# Patient Record
Sex: MALE
[De-identification: names, ages, dates, MRNs, and addresses within clinical notes are randomized per-mention and may not be internally consistent; named-entity substitution may affect disease eponyms.]

---

## 2019-09-23 PROBLEM — Z00.00 ENCOUNTER FOR PREVENTIVE HEALTH EXAMINATION: Status: ACTIVE | Noted: 2019-09-23

## 2019-09-24 ENCOUNTER — APPOINTMENT (OUTPATIENT)
Dept: OTOLARYNGOLOGY | Facility: CLINIC | Age: 84
End: 2019-09-24
Payer: MEDICARE

## 2019-09-24 VITALS
HEIGHT: 67 IN | DIASTOLIC BLOOD PRESSURE: 90 MMHG | WEIGHT: 165 LBS | SYSTOLIC BLOOD PRESSURE: 174 MMHG | HEART RATE: 65 BPM | BODY MASS INDEX: 25.9 KG/M2

## 2019-09-24 PROCEDURE — 31575 DIAGNOSTIC LARYNGOSCOPY: CPT

## 2019-09-24 PROCEDURE — 99214 OFFICE O/P EST MOD 30 MIN: CPT | Mod: 25

## 2019-09-24 PROCEDURE — 92553 AUDIOMETRY AIR & BONE: CPT | Mod: RT

## 2019-09-24 NOTE — REASON FOR VISIT
[Initial Evaluation] : an initial evaluation for [Hearing Loss] : hearing loss [Tinnitus] : tinnitus

## 2019-09-24 NOTE — ASSESSMENT
[FreeTextEntry1] : 1.  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 He has resolved and did not suggest other intervention\par 2.  He had 3 hours of tinnitus in the right ear that has since resolved. It was quite round and possibly be related to an extra couple of decibel hearing loss since his last visit. I reassured him that there was no significant pathology\par 3. his hearing is normal for the speech frequencies with sharp drop loss above that this was reviewed with him and I suggested repeating his audiogram in a year.\par 4. He has a pea sized right base of tongue apparent retention cyst that is asymptomatic.  I would reevaluate and marsupialize should this become symptomatic, enlarge or become infected.  I will re check next year.

## 2019-09-24 NOTE — PHYSICAL EXAM
[FreeTextEntry1] : \par The patient was alert and oriented and in no distress.\par Voice was clear.\par \par Face:\par The patient had no facial asymmetry or mass.\par The skin was unremarkable.\par \par Eyes:\par The pupils were equal round and reactive to light and accommodation.\par There was no significant nystagmus or disconjugate gaze noted.\par \par Nose: \par The external nose had no significant deformity.  There was no facial tenderness.\par Nasal endoscopy: \par \par Procedure Note:\par \par Nasal endoscopy was done with topical anesthesia and a fiberoptic endoscope.\par Indication: Nasal congestion, rule out sinusitis.\par Procedure: The nasal cavity was anesthetized with topical Afrin and Pontocaine. An  endoscope was used and inserted into the nasal cavity. \par Endocoscopy was performed to inspect the interior of the nasal cavity, the nasal septum,  the middle and superior meati, the inferior, middle and superior turbinates, and the spheno-ethmoidal  recesses, the nasopharynx and eustachian tube orifices bilaterally\par  This showed that the nasal mucosa was slightly boggy.  There was a moderate S-shaped septal deflection.  However, the middle meati were open.  There were no polyps, purulence or masses.  The eustachian tube orifices were clear.  The nasopharynx was benign and without mass.  The inferior and middle turbinates were slightly boggy, the superior meati were normal and the sphenoethmoidal recesses were without evidence of disease.\par \par He had a right septal deflection and moderately boggy mucosa\par \par \par Flexible fiberoptic laryngoscopy: CPT 52635\par Indications: Dysphagia\par Procedure note:\par \par Flexible fiberoptic laryngoscopy was performed because of dysphagia and because of the patient's inability to tolerate adequate mirror examination.\par \par The nasal cavity was anesthetized with Pontocaine and Afrin.\par The flexible endoscope was placed into the patient's nasal cavity.\par The nasopharynx was without masses.\par The oropharynx, vallecula and base of tongue had no masses.\par The epiglottis, aryepiglottic folds and false vocal cords were normal.\par The pyriform sinuses were without mucosal lesions or pooling of secretions.  \par The laryngeal ventricles were without lesions.\par The visualized subglottis was without mass.\par The lateral and posterior pharyngeal walls were clear and symmetrical.\par The vocal folds were clear and mobile; they abducted and adducted normally.\par There was no interarytenoid mass or fullness.\par He had a marble-sized right base of tongue whitish colored apparent retention cyst\par \par \par \par Oral cavity:\par The oral mucosa was normal.\par The oral and base of tongue were clear and without mass.\par The gingival and buccal mucosa were moist and without lesions.\par The palate moved well.\par There was no cleft to the palate.\par There appeared to be good salivary flow.  \par There was no pus, erythema or mass in the oral cavity.\par His base of tongue is soft to palpation\par \par Ears:\par The external ears were normal without deformity.\par There was a moderate amount of cerumen cleared on the left\par The ear canals were clear.\par The tympanic membranes were intact and normal.\par \par Neck: \par The neck was symmetrical.\par The parotid and submandibular glands were normal without masses.\par The trachea was midline and there was no unusual crepitus.\par The thyroid was smooth and nontender and no masses were palpated.\par There was no significant cervical adenopathy.\par \par \par Neuro:\par Neurologically, the patient was awake, alert, and oriented to person, place and time. There were no obvious focal neurologic abnormalities.  Cranial nerves II through XII were grossly intact.\par \par \par TMJ:\par The temporomandibular joints were nontender.\par There was no abnormal crepitus and no significant malocclusion\par \par Because of the new onset of tinnitus which has resolved an audiogram was repeated and compared to last month. He may have had a few extra decibels of loss on the right but there was no significant change otherwise.\par \par

## 2019-09-24 NOTE — CONSULT LETTER
[FreeTextEntry2] : MYNOR PRYOR\par  [FreeTextEntry1] : \par \par Dear  Dr. MYNOR PRYOR,\par \par I had the pleasure of seeing your patient today.  \par Please see my note below.\par \par \par Thank you very much for allowing me to participate in the care of your patient.\par \par Sincerely,\par \par Naseem Helton\par \par David Helton MD\par [facility]\par [facility]\par  [facility]\par \par

## 2020-09-22 ENCOUNTER — APPOINTMENT (OUTPATIENT)
Dept: OTOLARYNGOLOGY | Facility: CLINIC | Age: 85
End: 2020-09-22
Payer: MEDICARE

## 2020-09-22 VITALS — WEIGHT: 170 LBS | BODY MASS INDEX: 26.68 KG/M2 | TEMPERATURE: 97.1 F | HEIGHT: 67 IN

## 2020-09-22 DIAGNOSIS — H93.11 TINNITUS, RIGHT EAR: ICD-10-CM

## 2020-09-22 PROCEDURE — 99214 OFFICE O/P EST MOD 30 MIN: CPT | Mod: 25

## 2020-09-22 PROCEDURE — 92557 COMPREHENSIVE HEARING TEST: CPT

## 2020-09-22 PROCEDURE — 92567 TYMPANOMETRY: CPT

## 2020-09-22 PROCEDURE — 31575 DIAGNOSTIC LARYNGOSCOPY: CPT

## 2020-09-22 PROCEDURE — G0268 REMOVAL OF IMPACTED WAX MD: CPT

## 2020-09-22 NOTE — ASSESSMENT
[FreeTextEntry1] : It was my impression that the patient had a cerumen impaction that was cleared.  I recommended topical moisturizing.  I recommended avoiding Q-tips.  I reviewed aural hygiene and the role of cerumen with the patient.  I suggested a repeat visit in a year or earlier should the need arise.\par He had a new onset, again, of a nonviable some right-sided tinnitus.\par It was my impression that this was a non-pathologic tinnitus. I explained the etiology and that this most likely will become less and less bothersome over time. Treatment options such as white noise were discussed. Otherwise, I reassured the patient and would recommend repeating a hearing test in the year or earlier if needed\par His hearing is normal through the spelled frequencies with a sharp symmetrically drop-off above that. We discussed the issues in terms of hearing in hearing aids and I would repeat hearing test a year or earlier if needed.\par He has a small exophytic apparent retention cyst of the right base of tongue, unchanged from previous and not symptomatic for which I recommended observation with intervention should it become necessary.

## 2020-09-22 NOTE — HISTORY OF PRESENT ILLNESS
[de-identified] : KYLER GARCIA is a 84 year old male who comes in complaining of increased tinnitus in the right ear. This mostly a problem at night. He denies otalgia or otorrhea.His wife thinks that he has lost some more hearing, however. I had seen him a year ago with fleeting tinnitus that seemed better. He also had a base of tongue cyst and wanted to reevaluate which otherwise is asymptomatic.\par The patient had no other ear nose or throat complaints at this visit.

## 2020-09-22 NOTE — CONSULT LETTER
[FreeTextEntry2] : MYNOR PRYOR\par  [FreeTextEntry1] : \par \par Dear  Dr. MYNOR PRYOR,\par \par I had the pleasure of seeing your patient today.  \par Please see my note below.\par \par \par Thank you very much for allowing me to participate in the care of your patient.\par \par Sincerely,\par \par Naseem Helton\par \par David Helton MD\par [facility]\par [facility]\par  [facility]\par \par

## 2020-09-22 NOTE — PHYSICAL EXAM
[FreeTextEntry1] : \par The patient was alert and oriented and in no distress.\par Voice was clear.\par \par Face:\par The patient had no facial asymmetry or mass.\par The skin was unremarkable.\par \par Eyes:\par The pupils were equal round and reactive to light and accommodation.\par There was no significant nystagmus or disconjugate gaze noted.\par \par Nose: \par The external nose had no significant deformity.  There was no facial tenderness.  On anterior rhinoscopy, the nasal mucosa was clear.  The anterior septum was midline.  There were no visualized polyps purulence  or masses.\par \par Oral cavity:\par The oral mucosa was normal.\par The oral and base of tongue were clear and without mass.\par The gingival and buccal mucosa were moist and without lesions.\par The palate moved well.\par There was no cleft to the palate.\par There appeared to be good salivary flow.  \par There was no pus, erythema or mass in the oral cavity.\par \par Ears:\par  Procedure note:\par  Removal of Cerumen Impactions, both ears:  88027-43\par Both external ears were normal.\par There were symptomatic cerumen impactions in both ears.  These were cleared microscopically without trauma using both suction and curettes.  After clearing,  both ear canals were clear both eardrums were intact and mobile.  \par \par Neck: \par The neck was symmetrical.\par The parotid and submandibular glands were normal without masses.\par The trachea was midline and there was no unusual crepitus.\par The thyroid was smooth and nontender and no masses were palpated.\par There was no significant cervical adenopathy.\par \par \par Neuro:\par Neurologically, the patient was awake, alert, and oriented to person, place and time. There were no obvious focal neurologic abnormalities.  Cranial nerves II through XII were grossly intact.\par \par \par TMJ:\par The temporomandibular joints were nontender.\par There was no abnormal crepitus and no significant malocclusion\par \par  [de-identified] : Flexible fiberoptic laryngoscopy: [facility] [address]\par Indications: Dysphagia\par Procedure note:\par \par Flexible fiberoptic laryngoscopy was performed because of dysphagia and because of the patient's inability to tolerate adequate mirror examination.\par \par The nasal cavity was anesthetized with Pontocaine and Afrin.\par The flexible endoscope was placed into the patient's nasal cavity.\par The nasopharynx was without masses.\par The oropharynx, vallecula and base of tongue had no masses.\par The epiglottis, aryepiglottic folds and false vocal cords were normal.\par The pyriform sinuses were without mucosal lesions or pooling of secretions.  \par The laryngeal ventricles were without lesions.\par The visualized subglottis was without mass.\par The lateral and posterior pharyngeal walls were clear and symmetrical.\par The vocal folds were clear and mobile; they abducted and adducted normally.\par There was no interarytenoid mass or fullness.\par \par Endoscopy was done with Covid precautions and with video. All risks and benefits were discussed with the patient and consent obtained.\par \par There was a small right-sided exophytic base of tongue apparent retention cyst, unchanged\par \par \par Audiogram:\par \par Audiometry showed that both ears had normal hearing through the speech frequencies with high pitched symmetric sensorineural hearing loss as above that.  Reflexes were intact and the patient had type A tympanograms.  The audiogram was reviewed with the patient. \par \par \par He had lost a few dB at 6000 carrie, symmetrically

## 2022-05-03 ENCOUNTER — APPOINTMENT (OUTPATIENT)
Dept: OTOLARYNGOLOGY | Facility: CLINIC | Age: 87
End: 2022-05-03
Payer: MEDICARE

## 2022-05-03 VITALS — TEMPERATURE: 98 F | HEIGHT: 67 IN | BODY MASS INDEX: 25.9 KG/M2 | WEIGHT: 165 LBS

## 2022-05-03 DIAGNOSIS — K14.8 OTHER DISEASES OF TONGUE: ICD-10-CM

## 2022-05-03 PROCEDURE — 99214 OFFICE O/P EST MOD 30 MIN: CPT | Mod: 25

## 2022-05-03 PROCEDURE — 92557 COMPREHENSIVE HEARING TEST: CPT

## 2022-05-03 PROCEDURE — 92567 TYMPANOMETRY: CPT

## 2022-05-03 PROCEDURE — 31231 NASAL ENDOSCOPY DX: CPT

## 2022-05-03 PROCEDURE — G0268 REMOVAL OF IMPACTED WAX MD: CPT

## 2022-05-03 NOTE — CONSULT LETTER
[FreeTextEntry2] : MYNOR PRYOR\par  [FreeTextEntry1] : \par \par Dear  Dr. MYNOR PRYOR,\par \par I had the pleasure of seeing your patient today.  \par Please see my note below.\par \par \par Thank you very much for allowing me to participate in the care of your patient.\par \par Sincerely,\par \par Naseem Helton\par \par David Helton MD\par [facility]\par [facility]\par  [facility]\par \par

## 2022-05-03 NOTE — HISTORY OF PRESENT ILLNESS
[de-identified] : KYLER GARCIA is a 86 year old male who comes in complaining of several issues as far as the head and neck.  The first is that he feels his hearing has gotten worse, especially having more difficulty with television.  He notes that he feels as if he has a cerumen impaction.  He has pruritus more in the right ear than the left and an intermittent pounding discomfort in the left ear.  I had also seen him with a retention cyst of the right base of tongue and this is asymptomatic.  The patient had no other ear nose or throat complaints at this visit.

## 2022-05-03 NOTE — ASSESSMENT
[FreeTextEntry1] : It was my impression that the patient had a cerumen impaction that was cleared.  I recommended topical moisturizing.  I recommended avoiding Q-tips.  I reviewed aural hygiene and the role of cerumen with the patient.  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  This was discussed.  I did suggest a sound bar for his television and we will repeat the hearing test in a year or earlier if needed.\par \par He has the retention cyst of the right base of tongue, unchanged.\par \par He has a history of the right-sided traumatic septal deflection and he now has some small polypoid changes medial to the middle turbinate on the left.  I did not biopsy even though unilateral as they looked innocent.  However, I will reevaluate at his next visit and would recommend biopsy if there is any change or growth.\par \par

## 2022-05-03 NOTE — PHYSICAL EXAM
[FreeTextEntry1] : General:\par The patient was alert and oriented and in no distress.\par Voice was clear.  The patient looked less than the stated age\par \par Oral cavity:\par The oral mucosa was normal.\par The oral and base of tongue were clear and without mass.\par The gingival and buccal mucosa were moist and without lesions.\par The palate moved well.\par There was no cleft to the palate.\par There appeared to be good salivary flow.  \par There was no pus, erythema or mass in the oral cavity.\par \par Neck: \par The neck was symmetrical.\par The parotid and submandibular glands were normal without masses.\par The trachea was midline and there was no unusual crepitus.\par The thyroid was smooth and nontender and no masses were palpated.\par There was no significant cervical adenopathy.\par \par Neuro:\par Neurologically, the patient was awake, alert, and oriented to person, place and time. There were no obvious focal neurologic abnormalities.  Cranial nerves II through XII were grossly intact.\par \par TMJ:\par The temporomandibular joints were moderately tender.\par There was no abnormal crepitus and no significant malocclusion\par \par Ears:\par Procedure Note\par Removal of Cerumen- right ear   cpt 82376\par Dx:  Symptomatic cerument impaction- right ear\par Both external ears were normal.\par There was a dense cerumen impaction in the right ear.  This was cleared microscopically using suction and curettes without trauma.  Beyond that, both ear canals were clear both eardrums were intact and mobile.\par He had an eczematoid dermatitis as well.\par \par \par Nose:\par He had obstructing right septal deflection.\par \par Nasal endoscopy: \par CPT 35536\par Procedure Note:\par \par Endoscopy was done with Covid precautions and with video. All risks and benefits were discussed with the patient and consent obtained.\par \par Nasal endoscopy was done with topical anesthesia of Pontocaine and Afrin and a      nasal endoscope.\par Indication: Nasal congestion, rule out sinusitis.\par Procedure: The nasal cavity was anesthetized with topical Afrin and Pontocaine. An  endoscope was used and inserted into the nasal cavity.\par Attention was first paid to the anterior nasal cavity.\par Endocoscopy was performed to inspect the interior of the nasal cavity, the nasal septum,  the middle and superior meati, the inferior, middle and superior turbinates, and the spheno-ethmoidal  recesses, the nasopharynx and eustachian tube orifices bilaterally. \par All findings were normal except:\par He had an obstructing right septal deflection but beyond that no polyps purulence or masses.  He had some scant polypoid changes medial to the left middle turbinate\par \par \par Flexible fiberoptic laryngoscopy: [facility] [address]255\par Indications: Dysphagia\par Procedure note:\par \par Flexible fiberoptic laryngoscopy was performed because of dysphagia and because of the patient's inability to tolerate adequate mirror examination.\par \par The nasal cavity was anesthetized with Pontocaine and Afrin.\par The flexible endoscope was placed into the patient's nasal cavity.\par The nasopharynx was without masses.\par The oropharynx, vallecula and base of tongue had no masses.\par The epiglottis, aryepiglottic folds and false vocal cords were normal.\par The pyriform sinuses were without mucosal lesions or pooling of secretions.  \par The laryngeal ventricles were without lesions.\par The visualized subglottis was without mass.\par The lateral and posterior pharyngeal walls were clear and symmetrical.\par The vocal folds were clear and mobile; they abducted and adducted normally.\par There was no interarytenoid mass or fullness.\par \par Endoscopy was done with Covid precautions and with video. All risks and benefits were discussed with the patient and consent obtained.\par \par There was an approximately half centimeter round smooth exophytic cystic lesion of the right base of tongue, unchanged from before. [de-identified] : Audiogram:\par \par Audiometry showed that both ears had normal hearing through the speech frequencies with high pitched symmetric sensorineural hearing loss as above that.  Reflexes were intact and the patient had type A tympanograms.  The audiogram was reviewed with the patient.  He had lost about 5 dB in the last year and a half and still has excellent discrimination and type a tympanograms

## 2023-10-24 ENCOUNTER — TRANSCRIPTION ENCOUNTER (OUTPATIENT)
Age: 88
End: 2023-10-24

## 2023-10-24 ENCOUNTER — APPOINTMENT (OUTPATIENT)
Dept: OTOLARYNGOLOGY | Facility: CLINIC | Age: 88
End: 2023-10-24
Payer: MEDICARE

## 2023-10-24 PROCEDURE — 31231 NASAL ENDOSCOPY DX: CPT

## 2023-10-24 PROCEDURE — 99213 OFFICE O/P EST LOW 20 MIN: CPT | Mod: 25

## 2023-12-19 ENCOUNTER — APPOINTMENT (OUTPATIENT)
Dept: OTOLARYNGOLOGY | Facility: CLINIC | Age: 88
End: 2023-12-19
Payer: MEDICARE

## 2023-12-19 DIAGNOSIS — H60.8X3 OTHER OTITIS EXTERNA, BILATERAL: ICD-10-CM

## 2023-12-19 DIAGNOSIS — H61.23 IMPACTED CERUMEN, BILATERAL: ICD-10-CM

## 2023-12-19 PROCEDURE — 99214 OFFICE O/P EST MOD 30 MIN: CPT | Mod: 25

## 2023-12-19 PROCEDURE — 31231 NASAL ENDOSCOPY DX: CPT

## 2023-12-19 PROCEDURE — G0268 REMOVAL OF IMPACTED WAX MD: CPT

## 2023-12-19 PROCEDURE — 92567 TYMPANOMETRY: CPT

## 2023-12-19 PROCEDURE — 92557 COMPREHENSIVE HEARING TEST: CPT

## 2023-12-19 RX ORDER — CEFUROXIME AXETIL 250 MG/1
250 TABLET ORAL
Qty: 20 | Refills: 1 | Status: ACTIVE | COMMUNITY
Start: 2023-12-19 | End: 1900-01-01

## 2023-12-25 LAB — EAR NOSE AND THROAT CULTURE: ABNORMAL

## 2024-01-12 ENCOUNTER — APPOINTMENT (OUTPATIENT)
Dept: CT IMAGING | Facility: CLINIC | Age: 89
End: 2024-01-12
Payer: MEDICARE

## 2024-01-12 PROCEDURE — 70486 CT MAXILLOFACIAL W/O DYE: CPT

## 2024-01-16 NOTE — ASSESSMENT
[FreeTextEntry1] : It was my impression that he has the reflux and I explained that medication makes this less acidic but does not stop reflux.  I again reviewed diet and suggested a trial of alkaline water  It was my impression that the patient had a cerumen impaction that was cleared.  I recommended topical moisturizing.  I recommended avoiding Q-tips.  I reviewed aural hygiene and the role of cerumen with the patient.  I suggested a repeat visit in a year or earlier should the need arise.  He has the nasal congestion septal deflection and chronic sinusitis on dental imaging.  Today he had some purulence and this was cultured.  I placed him on a course of cefuroxime and suggested posttreatment imaging before he goes ahead with his dental implant  His hearing is relatively stable and borderline for hearing aids and this was discussed and I would repeat the hearing test in a year or as needed

## 2024-01-16 NOTE — PHYSICAL EXAM
[FreeTextEntry1] : General: The patient was alert and oriented and in no distress. Voice was clear.  Ears:  Procedure note:  Removal of Cerumen Impactions, both ears:  17911-20 Both external ears were normal. There were symptomatic cerumen impactions in both ears.  These were cleared microscopically without trauma using both suction and curettes.  After clearing,  both ear canals were clear both eardrums were intact and mobile.    Oral cavity: The oral mucosa was normal. The oral and base of tongue were clear and without mass. The gingival and buccal mucosa were moist and without lesions. The palate moved well. There was no cleft to the palate. There appeared to be good salivary flow.   There was no pus, erythema or mass in the oral cavity.  Neck:  The neck was symmetrical. The parotid and submandibular glands were normal without masses. The trachea was midline and there was no unusual crepitus. The thyroid was smooth and nontender and no masses were palpated. There was no significant cervical adenopathy.  Face: The patient had no facial asymmetry or mass. The skin was unremarkable.  Nasal endoscopy:  CPT 46801 Procedure Note:  Endoscopy was done with Covid precautions and with video. All risks and benefits were discussed with the patient and consent obtained.  Nasal endoscopy was done with topical anesthesia of Pontocaine and Afrin and a      nasal endoscope. Indication: Nasal congestion, rule out sinusitis. Procedure: The nasal cavity was anesthetized with topical Afrin and Pontocaine. An  endoscope was used and inserted into the nasal cavity. Attention was first paid to the anterior nasal cavity. Endocoscopy was performed to inspect the interior of the nasal cavity, the nasal septum,  the middle and superior meati, the inferior, middle and superior turbinates, and the spheno-ethmoidal  recesses, the nasopharynx and eustachian tube orifices bilaterally. including the nasal mocosa, the possibility of polyps and the consistency of the nasal mucous. All findings were normal except:  The mucosa is boggy with a right septal deflection and there was a purulent discharge in each middle meatus, the right was then cultured endoscopically with a rigid 0 degree endoscope   Flexible fiberoptic laryngoscopy: CPT 55783 Indications: Dysphagia Procedure note:  Flexible fiberoptic laryngoscopy was performed because of dysphagia and because of the patient's inability to tolerate adequate mirror examination.  The nasal cavity was anesthetized with Pontocaine and Afrin. The flexible endoscope was placed into the patient's nasal cavity. The nasopharynx was without masses. The oropharynx, vallecula and base of tongue had no masses. The epiglottis, aryepiglottic folds and false vocal cords were normal. The pyriform sinuses were without mucosal lesions or pooling of secretions.   The laryngeal ventricles were without lesions. The visualized subglottis was without mass. The lateral and posterior pharyngeal walls were clear and symmetrical. The vocal folds were clear and mobile; they abducted and adducted normally. There was no interarytenoid mass or fullness.  Endoscopy was done with Covid precautions and with video. All risks and benefits were discussed with the patient and consent obtained. He has The stable right base of tongue retention cyst and laryngeal evidence of reflux\\\\\\\ [de-identified] : Audiogram:  Audiometry showed that both ears had normal hearing through the speech frequencies with high pitched symmetric sensorineural hearing losses above that.  Reflexes were intact and the patient had type A tympanograms.  The audiogram was reviewed with the patient.

## 2024-01-16 NOTE — ADDENDUM
[FreeTextEntry1] : 12/25. kerrie.     1/16/24  films and report reviewed... right pan sinusitis- mostly sparing right sphenoid with sharp obstructing right septal deflection.   ? odontogenic  appearance.

## 2024-01-16 NOTE — CONSULT LETTER
[FreeTextEntry2] : MYNOR PRYOR [FreeTextEntry1] :   Dear  Dr. MYNOR PRYOR,  I had the pleasure of seeing your patient today.   Please see my note below.   Thank you very much for allowing me to participate in the care of your patient.  Sincerely,  David Helton MD [facility]  [facility]

## 2024-01-16 NOTE — HISTORY OF PRESENT ILLNESS
[de-identified] : KYLER GARCIA was seen on December 19.  He had last seen Dr. Chavez in October because of dental imaging showing a right-sided sinusitis.  He had not had the recommended CT follow-up.  He comes in now complaining of cerumen impaction and hearing loss.  He thinks his hearing might of gotten worse.  I had seen him in the past with a retention cyst in the right base of tongue.  The patient had no other ear nose or throat complaints at this visit.  He notes that he has had some significant reflux episodes in spite of diet, Prevacid in the morning and famotidine at night

## 2024-01-25 ENCOUNTER — APPOINTMENT (OUTPATIENT)
Dept: OTOLARYNGOLOGY | Facility: CLINIC | Age: 89
End: 2024-01-25
Payer: MEDICARE

## 2024-01-25 DIAGNOSIS — H90.3 SENSORINEURAL HEARING LOSS, BILATERAL: ICD-10-CM

## 2024-01-25 PROCEDURE — 31231 NASAL ENDOSCOPY DX: CPT

## 2024-01-25 PROCEDURE — 99214 OFFICE O/P EST MOD 30 MIN: CPT | Mod: 25

## 2024-01-25 RX ORDER — FAMOTIDINE 10 MG/1
TABLET, FILM COATED ORAL
Refills: 0 | Status: ACTIVE | COMMUNITY

## 2024-04-24 ENCOUNTER — APPOINTMENT (OUTPATIENT)
Dept: OTOLARYNGOLOGY | Facility: CLINIC | Age: 89
End: 2024-04-24
Payer: MEDICARE

## 2024-04-24 PROCEDURE — 31296Z: CUSTOM | Mod: 50

## 2024-04-24 PROCEDURE — 31254 NSL/SINS NDSC W/PRTL ETHMDCT: CPT | Mod: RT

## 2024-04-24 PROCEDURE — 31295Z: CUSTOM | Mod: RT

## 2024-04-24 RX ORDER — FLUTICASONE PROPIONATE 50 UG/1
50 SPRAY, METERED NASAL
Qty: 16 | Refills: 5 | Status: ACTIVE | COMMUNITY
Start: 2023-12-19 | End: 1900-01-01

## 2024-04-24 RX ORDER — PREDNISONE 20 MG/1
20 TABLET ORAL DAILY
Qty: 4 | Refills: 0 | Status: ACTIVE | COMMUNITY
Start: 2024-04-24 | End: 1900-01-01

## 2024-04-24 RX ORDER — AMOXICILLIN AND CLAVULANATE POTASSIUM 875; 125 MG/1; MG/1
875-125 TABLET, COATED ORAL TWICE DAILY
Qty: 20 | Refills: 2 | Status: ACTIVE | COMMUNITY
Start: 2024-04-24 | End: 1900-01-01

## 2024-04-27 NOTE — HISTORY OF PRESENT ILLNESS
[de-identified] : KYLER GARCIA was seen on April 24 for a right sided balloon sinuplasty maxillary frontal and partial ethmoidectomy.  He has the right sided pansinusitis, sparing the sphenoid from odontogenic etiology.  Because of the persistence of symptoms the imaging findings and failure of medical therapy it was felt that the patient would benefit from drainage.  All risks benefits and alternatives were discussed with the patient at length who understood and agreed.

## 2024-04-27 NOTE — PHYSICAL EXAM
[FreeTextEntry1] : Procedure: Frontal balloon sinuplasty  right  with image guidance Surgeon: Sunitha Anesthesia: Topical plus local  Because of the persistence of the frontal sinusitis in was felt that the patient would benefit from balloon sinuplasty.  All risks and benefits were explained to the patient as well as the possibility that the procedure would not be successful and the patient could require endoscopic sinus surgery. The patient was kept and draped in the usual fashion.  The nasal cavity was anesthetized with topical tetracaine and Afrin and 1% lidocaine without  epinephrine  was used for further anesthesia. After a suitable level of anesthesia was obtained, attention was paid to the frontal recess first on the left.  The middle turbinate was medialized with a Payette.  The Medtornix balloon was placed into the middle meatus.  It was advanced into the frontal recess under direct visualization and using image guidance.  The tip was advanced until it was placed into the frontal sinus.  Then the balloon was advanced.  It was inflated x3 opening the frontal recess widely.  The frontal recess was suctioned.  Reevaluation showed a patent frontal recess and frontal sinus.  After completion of the left side a similar procedure was carried out on the right.  There were no complications.  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  After ballooning the right frontal sinus was suctioned with a curved image guided sinus suction  The purulent discharge was cultured at the beginning of the procedure  Anterior ethmoidectomy  Because of the extent of the discharge and the inflammation in the ethmoid labyrinth, the balloon was used to widen the ethmoid labyrinth without resection and without complication    Preoperative diagnosis: Maxillary sinusitis right  Surgeon: Sunitha Procedure: Maxillary sinus balloon sinuplasty right                    with image guidance    Findings; Because of the persistence of maxillary sinusitis in spite of aggressive medical management and imaging findings it was felt that the patient would benefit from balloon sinuplasty.  All risks and benefits of sinuplasty as well as the possibility of the need for an endoscopic procedure with discussed with the patient.  The patient understood and agreed.  The nasal cavity was anesthetized topically and locally with 1% lidocaine without  epinephrine. The image guidance was set up and the fiduciary points marked.  Image guidance was used throughout the procedure. After a suitable level of anesthesia was obtained attention was paid to the middle meatus. The middle turbinate was medialized.  The uncinate was reflected with a seeker.  The maxillary sinus balloon sinuplasty instrument was placed into the nasal cavity and the uncinate was hooked.  Under both direct visualization and image guidance, the tip of the instrument was placed into the maxillary sinus, through the natural ostium  and insufflated x3.  This opened the maxillary sinus ostium and medialized the middle turbinate.  The sinus was suctioned.  The instruments then was removed.   there were no complications and the patient tolerated the procedure well.   There was significant amounts of purulent discharge in the right middle meatus that was cultured at the beginning of the procedure.   The right antrum was filled with significant amounts of foul-smelling purulent discharge this was suctioned several times to clear and the antrostomy opened wider.

## 2024-04-27 NOTE — ASSESSMENT
[FreeTextEntry1] : It was my impression that he had an uneventful endoscopic image guided Sinuplasties with significant amounts of.  Foul-smelling pus.  This was cultured after cleaning and I placed him on Augmentin and 4 days of 20 mg of prednisone.  I will see him back in follow-up next week and would plan using medicated irrigations as well

## 2024-04-27 NOTE — CONSULT LETTER
[FreeTextEntry2] : MYNOR PRYOR  [FreeTextEntry1] :   Dear  Dr. MYNOR PRYOR,  I had the pleasure of seeing your patient today.   Please see my note below.   Thank you very much for allowing me to participate in the care of your patient.  Sincerely,  Naseem Helton MD [facility]  [facility]

## 2024-04-30 ENCOUNTER — APPOINTMENT (OUTPATIENT)
Dept: OTOLARYNGOLOGY | Facility: CLINIC | Age: 89
End: 2024-04-30
Payer: MEDICARE

## 2024-04-30 LAB — EAR NOSE AND THROAT CULTURE: ABNORMAL

## 2024-04-30 PROCEDURE — 31237 NSL/SINS NDSC SURG BX POLYPC: CPT | Mod: RT

## 2024-04-30 RX ORDER — BUDESONIDE 0.5 MG/2ML
0.5 INHALANT ORAL TWICE DAILY
Qty: 2 | Refills: 3 | Status: ACTIVE | COMMUNITY
Start: 2024-04-30 | End: 1900-01-01

## 2024-04-30 NOTE — HISTORY OF PRESENT ILLNESS
[de-identified] : KYLER GARCIA Was seen in follow-up on April 30.  He was 6 days status post right-sided balloon sinuplasties and anterior ethmoidectomy.  At the time of surgery he had significant amounts of foul-smelling pus in the sinuses.  His early postoperative course has been good and he comes in for first postoperative debridement

## 2024-04-30 NOTE — CONSULT LETTER
[FreeTextEntry2] : MYNOR PRYOR [FreeTextEntry1] :   Dear  Dr. MYNOR PRYOR,  I had the pleasure of seeing your patient today.   Please see my note below.   Thank you very much for allowing me to participate in the care of your patient.  Sincerely,  David Helton MD [facility]  [facility]

## 2024-04-30 NOTE — ASSESSMENT
[FreeTextEntry1] : It was my impression that he was doing well.  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  I suggested budesonide rinses twice a day and we will see him back for repeat debridement in 2 weeks or as needed

## 2024-04-30 NOTE — PHYSICAL EXAM
[FreeTextEntry1] : The patient returns for postoperative sinus debridement CPT 17423-13.  Procedure note:  The nasal cavity was anesthetized topically and locally.  Both rigid 0 and 30 endoscopes were used for debridement.  .  Using straight and curved suctions and straight and 30 up-biting forceps, the nasal cavity and sinuses were debrided anteriorly.  The patient appeared to be having an excellent result.   Entering the right antrum with a curved antral suction, there was a small amount of old blood but no significant purulence and the antrostomy looks quite patent

## 2024-05-14 ENCOUNTER — APPOINTMENT (OUTPATIENT)
Dept: OTOLARYNGOLOGY | Facility: CLINIC | Age: 89
End: 2024-05-14
Payer: MEDICARE

## 2024-05-14 PROCEDURE — 31237 NSL/SINS NDSC SURG BX POLYPC: CPT | Mod: 50

## 2024-05-17 LAB — EAR NOSE AND THROAT CULTURE: ABNORMAL

## 2024-05-17 NOTE — CONSULT LETTER
[FreeTextEntry2] : MYNOR PRYOR [FreeTextEntry1] :   Dear  Dr. MYNOR PRYOR,  I had the pleasure of seeing your patient today.   Please see my note below.   Thank you very much for allowing me to participate in the care of your patient.  Sincerely,  David Helton MD [facility]  [facility]

## 2024-05-17 NOTE — PHYSICAL EXAM
[FreeTextEntry1] : The patient is status post endoscopic sinus surgery and returns for middle meatal debridement.  Procedure note:  25474-56  The nasal cavity was anesthetized topically and locally.  Using a rigid 0 and a rigid 30 endoscope, using both straight and curved suction iand a straight and the 30 up-biting forceps, the right middle meatus was suctioned.  There was significant thickening and congestion of the soft tissue but this opened widely with dilation.  There was a small amount of whitish discharge and a culture was taken but this did not looks like a significant infection.

## 2024-05-17 NOTE — ASSESSMENT
[FreeTextEntry1] : It was my impression that he was doing well but still has significant mucosal edema.  I did not want to treat him with oral steroids again and I took a culture but at this point I recommend continuing with the budesonide rinses and would like to reevaluate in 3 to 4 weeks to make sure this is opened

## 2024-06-11 ENCOUNTER — APPOINTMENT (OUTPATIENT)
Dept: OTOLARYNGOLOGY | Facility: CLINIC | Age: 89
End: 2024-06-11
Payer: MEDICARE

## 2024-06-11 VITALS — HEIGHT: 67 IN | WEIGHT: 165 LBS | BODY MASS INDEX: 25.9 KG/M2

## 2024-06-11 DIAGNOSIS — J32.0 CHRONIC MAXILLARY SINUSITIS: ICD-10-CM

## 2024-06-11 DIAGNOSIS — J31.0 CHRONIC RHINITIS: ICD-10-CM

## 2024-06-11 DIAGNOSIS — J33.9 NASAL POLYP, UNSPECIFIED: ICD-10-CM

## 2024-06-11 DIAGNOSIS — J34.2 DEVIATED NASAL SEPTUM: ICD-10-CM

## 2024-06-11 DIAGNOSIS — J32.2 CHRONIC ETHMOIDAL SINUSITIS: ICD-10-CM

## 2024-06-11 DIAGNOSIS — J32.1 CHRONIC FRONTAL SINUSITIS: ICD-10-CM

## 2024-06-11 PROCEDURE — 99213 OFFICE O/P EST LOW 20 MIN: CPT | Mod: 25

## 2024-06-11 PROCEDURE — 31231 NASAL ENDOSCOPY DX: CPT

## 2024-06-11 NOTE — ASSESSMENT
[FreeTextEntry1] : It was my impression that his obvious infection had resolved he no longer had the malodor but this still was significant swelling in the area lateral to the right middle turbinate.  I could not say for sure that this was completely drained.  At this point I recommended continuing on the nasal rinse with budesonide and I would not suggest other intervention but as he wants to get dental implant I would recommend repeating his imaging

## 2024-06-11 NOTE — HISTORY OF PRESENT ILLNESS
[de-identified] : KYLER GARCIA Was seen in follow-up on June 11.  He is 6 weeks status post right-sided balloon sinuplasty and anterior ethmoidectomy for right-sided odontogenic pansinusitis.  He is doing well and has no persistent complaints.  He is using the budesonide rinses.  The patient had no other ear nose or throat complaints at this visit.

## 2024-06-11 NOTE — PHYSICAL EXAM
[FreeTextEntry1] : General: The patient was alert and oriented and in no distress. Voice was clear.  Face: The patient had no facial asymmetry or mass. The skin was unremarkable.  Pertinent findings were limited to his nasal endoscopy Nasal endoscopy:  CPT 57821 Procedure Note:  Endoscopy was done with Covid precautions and with video. All risks and benefits were discussed with the patient and consent obtained.  Nasal endoscopy was done with topical anesthesia of Pontocaine and Afrin and a      nasal endoscope. Indication: Nasal congestion, rule out sinusitis. Procedure: The nasal cavity was anesthetized with topical Afrin and Pontocaine. An  endoscope was used and inserted into the nasal cavity. Attention was first paid to the anterior nasal cavity. Endocoscopy was performed to inspect the interior of the nasal cavity, the nasal septum,  the middle and superior meati, the inferior, middle and superior turbinates, and the spheno-ethmoidal  recesses, the nasopharynx and eustachian tube orifices bilaterally. including the nasal mocosa, the possibility of polyps and the consistency of the nasal mucous. All findings were normal except:  He has the significant septal deflection to the left-the right frontal sinuses open the right ethmoids look good but there is still obstruction in the area lateral to the middle turbinate without further purulence

## 2024-06-11 NOTE — CONSULT LETTER
[FreeTextEntry2] : MYNOR PRYOR [FreeTextEntry1] :   Dear  Dr. MYNOR PRYOR,  I had the pleasure of seeing your patient today.   Please see my note below.   Thank you very much for allowing me to participate in the care of your patient.  Sincerely,  David Helton MD [facility]  [facility]

## 2024-06-28 ENCOUNTER — OUTPATIENT (OUTPATIENT)
Dept: OUTPATIENT SERVICES | Facility: HOSPITAL | Age: 89
LOS: 1 days | End: 2024-06-28
Payer: MEDICARE

## 2024-06-28 ENCOUNTER — APPOINTMENT (OUTPATIENT)
Dept: CT IMAGING | Facility: CLINIC | Age: 89
End: 2024-06-28

## 2024-06-28 PROCEDURE — 70486 CT MAXILLOFACIAL W/O DYE: CPT | Mod: 26,MH

## 2024-07-10 ENCOUNTER — APPOINTMENT (OUTPATIENT)
Dept: OTOLARYNGOLOGY | Facility: CLINIC | Age: 89
End: 2024-07-10
Payer: MEDICARE

## 2024-07-10 DIAGNOSIS — J32.2 CHRONIC ETHMOIDAL SINUSITIS: ICD-10-CM

## 2024-07-10 DIAGNOSIS — J32.1 CHRONIC FRONTAL SINUSITIS: ICD-10-CM

## 2024-07-10 PROCEDURE — 99213 OFFICE O/P EST LOW 20 MIN: CPT

## 2025-04-29 ENCOUNTER — APPOINTMENT (OUTPATIENT)
Dept: OTOLARYNGOLOGY | Facility: CLINIC | Age: 89
End: 2025-04-29
Payer: MEDICARE

## 2025-04-29 DIAGNOSIS — H61.23 IMPACTED CERUMEN, BILATERAL: ICD-10-CM

## 2025-04-29 DIAGNOSIS — H90.3 SENSORINEURAL HEARING LOSS, BILATERAL: ICD-10-CM

## 2025-04-29 DIAGNOSIS — J34.2 DEVIATED NASAL SEPTUM: ICD-10-CM

## 2025-04-29 DIAGNOSIS — J32.2 CHRONIC ETHMOIDAL SINUSITIS: ICD-10-CM

## 2025-04-29 DIAGNOSIS — J32.1 CHRONIC FRONTAL SINUSITIS: ICD-10-CM

## 2025-04-29 DIAGNOSIS — J32.0 CHRONIC MAXILLARY SINUSITIS: ICD-10-CM

## 2025-04-29 DIAGNOSIS — J33.9 NASAL POLYP, UNSPECIFIED: ICD-10-CM

## 2025-04-29 PROCEDURE — 99214 OFFICE O/P EST MOD 30 MIN: CPT | Mod: 25

## 2025-04-29 PROCEDURE — 31231 NASAL ENDOSCOPY DX: CPT

## 2025-05-05 LAB — EAR NOSE AND THROAT CULTURE: NORMAL

## 2025-05-05 RX ORDER — AMOXICILLIN AND CLAVULANATE POTASSIUM 875; 125 MG/1; MG/1
875-125 TABLET, COATED ORAL TWICE DAILY
Qty: 20 | Refills: 2 | Status: ACTIVE | COMMUNITY
Start: 2025-05-05 | End: 1900-01-01

## 2025-09-10 ENCOUNTER — APPOINTMENT (OUTPATIENT)
Dept: CT IMAGING | Facility: CLINIC | Age: 89
End: 2025-09-10
Payer: MEDICARE

## 2025-09-10 PROCEDURE — 70486 CT MAXILLOFACIAL W/O DYE: CPT | Mod: TC

## 2025-09-11 ENCOUNTER — APPOINTMENT (OUTPATIENT)
Dept: OTOLARYNGOLOGY | Facility: CLINIC | Age: 89
End: 2025-09-11
Payer: MEDICARE

## 2025-09-11 DIAGNOSIS — J32.0 CHRONIC MAXILLARY SINUSITIS: ICD-10-CM

## 2025-09-11 DIAGNOSIS — H90.3 SENSORINEURAL HEARING LOSS, BILATERAL: ICD-10-CM

## 2025-09-11 DIAGNOSIS — J32.2 CHRONIC ETHMOIDAL SINUSITIS: ICD-10-CM

## 2025-09-11 DIAGNOSIS — J32.1 CHRONIC FRONTAL SINUSITIS: ICD-10-CM

## 2025-09-11 PROCEDURE — 31231 NASAL ENDOSCOPY DX: CPT

## 2025-09-11 PROCEDURE — 99214 OFFICE O/P EST MOD 30 MIN: CPT | Mod: 25

## 2025-09-11 RX ORDER — AMOXICILLIN AND CLAVULANATE POTASSIUM 875; 125 MG/1; MG/1
875-125 TABLET, COATED ORAL TWICE DAILY
Qty: 20 | Refills: 2 | Status: ACTIVE | COMMUNITY
Start: 2025-09-11 | End: 1900-01-01